# Patient Record
Sex: MALE | Race: OTHER | HISPANIC OR LATINO | URBAN - METROPOLITAN AREA
[De-identification: names, ages, dates, MRNs, and addresses within clinical notes are randomized per-mention and may not be internally consistent; named-entity substitution may affect disease eponyms.]

---

## 2018-01-20 ENCOUNTER — EMERGENCY (EMERGENCY)
Facility: HOSPITAL | Age: 43
LOS: 1 days | Discharge: HOME | End: 2018-01-20
Attending: EMERGENCY MEDICINE
Payer: SELF-PAY

## 2018-01-20 VITALS
OXYGEN SATURATION: 97 % | HEIGHT: 62 IN | SYSTOLIC BLOOD PRESSURE: 134 MMHG | DIASTOLIC BLOOD PRESSURE: 85 MMHG | TEMPERATURE: 99 F | HEART RATE: 99 BPM | WEIGHT: 139.99 LBS | RESPIRATION RATE: 18 BRPM

## 2018-01-20 PROCEDURE — 99283 EMERGENCY DEPT VISIT LOW MDM: CPT | Mod: 25

## 2018-01-20 PROCEDURE — 93005 ELECTROCARDIOGRAM TRACING: CPT

## 2018-01-20 PROCEDURE — 99284 EMERGENCY DEPT VISIT MOD MDM: CPT

## 2018-01-20 PROCEDURE — 71046 X-RAY EXAM CHEST 2 VIEWS: CPT

## 2018-01-20 PROCEDURE — 71046 X-RAY EXAM CHEST 2 VIEWS: CPT | Mod: 26

## 2018-01-20 PROCEDURE — T1013: CPT

## 2018-01-20 PROCEDURE — 93010 ELECTROCARDIOGRAM REPORT: CPT

## 2018-01-20 NOTE — ED STATDOCS - ATTENDING CONTRIBUTION TO CARE
Pa care supervised by me and I examined patient who c/o 3 days of flu symptoms cough headache body aches fever and then syncope after cough will tx with symptomatic flu and f/u [facility] clinic

## 2018-01-20 NOTE — ED STATDOCS - OBJECTIVE STATEMENT
42 y/o M pt presents to ED c/o  cough, fever, nasal congestion, chills, diffuse myalgia and HA X 3 days and reportedly had vasovagal event (for 20 minutes) at 03:00 AM earlier today due to cough. No further complaints at this time.  : Cally

## 2018-01-20 NOTE — ED STATDOCS - MEDICAL DECISION MAKING DETAILS
Pt with flus sxs HA, cough, nasal congestion, body aches, chills and brief syncopy s/p coughing. Will check chest XR and EKG if ok, will treat for flu and recommend Tylenol, fluids, rest and f/u with PMD.

## 2018-01-20 NOTE — ED ADULT NURSE NOTE - OBJECTIVE STATEMENT
pt presents to ED with subjective fever x few days. pt states he passed out in the bathroom today. as per wife, pt was passed out for 25 mins. pt denies head pain. denies chest pain/sob. pt c.o intermittent cough and mild sore throat. a&ox3. will continue to monitor and reassess

## 2018-01-20 NOTE — ED STATDOCS - PROGRESS NOTE DETAILS
PA NOTE: Pt seen by intake physician and hpi/orders/plan reviewed. PT presenting to ED with complaints of cough, body aches, x 3 days  PE: GEN: Awake, alert,  NAD,  EYES: PERRL Musculoskeletal: no deformities, full ROM, non-tender . NEURO: AOx3, no focal deficits   PLAN: ekg, cxr as per , cxr is negative

## 2018-01-20 NOTE — ED ADULT TRIAGE NOTE - CHIEF COMPLAINT QUOTE
pt c/o fever with coug for 3 days , wife reports pt "passed out for 20 minutes" at 3 am.  wife reports he finally woke and then it happened again.

## 2019-03-30 ENCOUNTER — EMERGENCY (EMERGENCY)
Facility: HOSPITAL | Age: 44
LOS: 1 days | Discharge: HOME | End: 2019-03-30
Attending: EMERGENCY MEDICINE | Admitting: EMERGENCY MEDICINE
Payer: COMMERCIAL

## 2019-03-30 VITALS
HEART RATE: 53 BPM | WEIGHT: 164.91 LBS | SYSTOLIC BLOOD PRESSURE: 123 MMHG | OXYGEN SATURATION: 97 % | TEMPERATURE: 98 F | RESPIRATION RATE: 16 BRPM | DIASTOLIC BLOOD PRESSURE: 77 MMHG | HEIGHT: 65 IN

## 2019-03-30 PROCEDURE — 90471 IMMUNIZATION ADMIN: CPT

## 2019-03-30 PROCEDURE — 12001 RPR S/N/AX/GEN/TRNK 2.5CM/<: CPT | Mod: LT

## 2019-03-30 PROCEDURE — 99283 EMERGENCY DEPT VISIT LOW MDM: CPT | Mod: 25

## 2019-03-30 PROCEDURE — 90715 TDAP VACCINE 7 YRS/> IM: CPT

## 2019-03-30 RX ORDER — TETANUS TOXOID, REDUCED DIPHTHERIA TOXOID AND ACELLULAR PERTUSSIS VACCINE, ADSORBED 5; 2.5; 8; 8; 2.5 [IU]/.5ML; [IU]/.5ML; UG/.5ML; UG/.5ML; UG/.5ML
0.5 SUSPENSION INTRAMUSCULAR ONCE
Qty: 0 | Refills: 0 | Status: COMPLETED | OUTPATIENT
Start: 2019-03-30 | End: 2019-03-30

## 2019-03-30 RX ADMIN — TETANUS TOXOID, REDUCED DIPHTHERIA TOXOID AND ACELLULAR PERTUSSIS VACCINE, ADSORBED 0.5 MILLILITER(S): 5; 2.5; 8; 8; 2.5 SUSPENSION INTRAMUSCULAR at 14:25

## 2019-03-30 NOTE — ED PROVIDER NOTE - OBJECTIVE STATEMENT
44M presents to the ED c/o left hand laceration x 1 hour. Pt states that he was using a knife at work and accidentally cut himself. Pt denies numbness/tingling and has no other complaints. Pt denies recent tetanus.

## 2019-03-30 NOTE — ED PROVIDER NOTE - PROGRESS NOTE DETAILS
Pt was told to visit registration window, but left ED without doing so. Pt was told to visit registration window, but left ED without doing so. Laceration was repaired, tetanus given.

## 2019-03-30 NOTE — ED PROVIDER NOTE - PHYSICAL EXAMINATION
Skin: Superficial 2cm laceration of the left dorsal proximal hand, minimal active bleeding, no tendon exposure.

## 2019-03-30 NOTE — ED PROVIDER NOTE - ATTENDING CONTRIBUTION TO CARE
The patient seen and examined.  The patient presents with hand laceration    I, Ari Nair, performed the initial face to face bedside interview with this patient regarding history of present illness, review of symptoms and relevant past medical, social and family history.  I completed an independent physical examination.  I was the initial provider who evaluated this patient. I have signed out the follow up of any pending tests (i.e. labs, radiological studies) to the ACP.  I have communicated the patient’s plan of care and disposition with the ACP.

## 2019-03-30 NOTE — ED PROVIDER NOTE - MUSCULOSKELETAL, MLM
Spine appears normal, range of motion is not limited, no muscle or joint tenderness. +abduction/adduction/flexion/extension of all digits.

## 2019-04-08 ENCOUNTER — EMERGENCY (EMERGENCY)
Facility: HOSPITAL | Age: 44
LOS: 1 days | Discharge: HOME | End: 2019-04-08
Attending: EMERGENCY MEDICINE
Payer: COMMERCIAL

## 2019-04-08 VITALS
OXYGEN SATURATION: 99 % | HEIGHT: 62 IN | WEIGHT: 154.98 LBS | RESPIRATION RATE: 18 BRPM | SYSTOLIC BLOOD PRESSURE: 130 MMHG | HEART RATE: 69 BPM | TEMPERATURE: 99 F | DIASTOLIC BLOOD PRESSURE: 86 MMHG

## 2019-04-08 PROCEDURE — T1013: CPT

## 2019-04-08 PROCEDURE — G0463: CPT

## 2019-04-08 NOTE — ED PROVIDER NOTE - ATTENDING CONTRIBUTION TO CARE
Jacinto: I performed a face to face bedside interview with patient regarding history of present illness, review of symptoms and past medical history. I completed an independent physical exam.  I have discussed patient's plan of care with advanced care provider.   I agree with note as stated above including HISTORY OF PRESENT ILLNESS, HIV, PAST MEDICAL/SURGICAL/FAMILY/SOCIAL HISTORY, ALLERGIES AND HOME MEDICATIONS, REVIEW OF SYSTEMS, PHYSICAL EXAM, MEDICAL DECISION MAKING and any PROGRESS NOTES during the time I functioned as the attending physician for this patient  unless otherwise noted. My brief assessment is as follows: suture removal left dorsal hand, placed 9 days ago, 5 sutures, clean, dry, no discharge, no tenderness, no f/c. sutures removed by HAROON Rendon. wound care and return precautions.

## 2019-04-08 NOTE — ED PROCEDURE NOTE - CPROC ED INFORMED CONSENT1
Benefits, risks, and possible complications of procedure explained to patient/caregiver who verbalized understanding and gave verbal consent./ED

## 2019-04-08 NOTE — ED PROVIDER NOTE - OBJECTIVE STATEMENT
43 y/o M with no PMHx presents to ED requesting suture removal of sutures that were placed 3/30/19 here in ED 8 days ago to left dorsal hand.  Patient denies fever/chills, NVD, increased pain or swelling, erythema, red streaking, discharge from wound. Pt has been cleaning wound as instructed.  Pt has no other acute complaints at this time.   Language Services was utilized in obtaining the H & P and throughout the duration of the patient's care.

## 2019-04-08 NOTE — ED PROVIDER NOTE - PHYSICAL EXAMINATION
Vital signs noted, see flowsheet.  General: Well nourished/developed. In no acute distress, well appearing and non-toxic.  HEENT: Moist mucous membranes.   Cardiac: Regular rate and rhythm. +S1/S2. Peripheral pulses 2+ and symmetric b/l.  Respiratory: Speaking in full sentences, no evidence of respiratory distress. Lungs clear to ascultation b/l  Skin: 2cm well healed laceration to left dorsal hand, no surrounding erythema/red streaking, no discharge, no increased warmth. Normal color for race, no evidence of rash, ecchymosis, cyanosis or jaundice.   LEFT UE: Non tender to palpation, no swelling, sensation intact, + FROM, peripheral pulses 2+ b/l. Capillary refill less than 2 seconds.   Neuro: Awake, alert and oriented to person/place/time/situation. Moves all extremities spontaneously and symmetrically.

## 2019-04-08 NOTE — ED PROVIDER NOTE - PROGRESS NOTE DETAILS
HAROON Rendon NOTE: Sutures removed, wound cleaned and dressed, pt given dressings and bacitracin for home, wound care instructions reviewed. Patient stable for discharge, reports no pain, vital signs stable, tolerating PO, ambulatory in ED.  Discussion with pt includes but is not limited to: results, plan, proper medication use and side effects, and return precautions. Patient will follow up with their PMD in 1-2 days. Advised patient to seek immediate medical attention for any new/worsening symptoms or concerns.  Patient provided with ample opportunity to ask questions which were answered to the fullest extent.  Patient verbalized understanding and agreement of plan.

## 2020-06-23 NOTE — ED STATDOCS - CHIEF COMPLAINT
PT arrives from work to be evaluated for numbness and \"feeling weird\" to her L side which began at 0620 this morning.     MD called to bedside for [facility].    The patient is a 43y year old Male complaining of fever.

## 2022-05-30 NOTE — ED PROVIDER NOTE - NS ED NOTE AC HIGH RISK COUNTRIES
necrotizing fascitis necrotizing fascitis necrotizing fascitis necrotizing fascitis necrotizing fascitis necrotizing fascitis necrotizing fascitis necrotizing fascitis necrotizing fascitis No